# Patient Record
Sex: MALE | Race: WHITE | NOT HISPANIC OR LATINO | URBAN - METROPOLITAN AREA
[De-identification: names, ages, dates, MRNs, and addresses within clinical notes are randomized per-mention and may not be internally consistent; named-entity substitution may affect disease eponyms.]

---

## 2019-02-13 ENCOUNTER — OFFICE (AMBULATORY)
Dept: URBAN - METROPOLITAN AREA CLINIC 64 | Facility: CLINIC | Age: 59
End: 2019-02-13
Payer: MEDICARE

## 2019-02-13 VITALS
SYSTOLIC BLOOD PRESSURE: 138 MMHG | HEIGHT: 71 IN | HEART RATE: 81 BPM | WEIGHT: 214 LBS | DIASTOLIC BLOOD PRESSURE: 92 MMHG

## 2019-02-13 DIAGNOSIS — R19.5 OTHER FECAL ABNORMALITIES: ICD-10-CM

## 2019-02-13 DIAGNOSIS — R74.8 ABNORMAL LEVELS OF OTHER SERUM ENZYMES: ICD-10-CM

## 2019-02-13 DIAGNOSIS — R11.0 NAUSEA: ICD-10-CM

## 2019-02-13 DIAGNOSIS — K21.9 GASTRO-ESOPHAGEAL REFLUX DISEASE WITHOUT ESOPHAGITIS: ICD-10-CM

## 2019-02-13 PROCEDURE — 99204 OFFICE O/P NEW MOD 45 MIN: CPT | Performed by: NURSE PRACTITIONER

## 2019-02-13 PROCEDURE — 99244 OFF/OP CNSLTJ NEW/EST MOD 40: CPT | Performed by: NURSE PRACTITIONER

## 2019-02-13 RX ORDER — ONDANSETRON HYDROCHLORIDE 4 MG/1
16 TABLET, FILM COATED ORAL
Qty: 40 | Refills: 0 | Status: ACTIVE
Start: 2019-02-13

## 2019-02-13 RX ORDER — SACCHAROMYCES BOULARDII 50 MG
500 CAPSULE ORAL
Qty: 60 | Refills: 1 | Status: ACTIVE
Start: 2019-02-13

## 2019-03-15 ENCOUNTER — HOSPITAL ENCOUNTER (OUTPATIENT)
Dept: OTHER | Facility: HOSPITAL | Age: 59
Discharge: HOME | End: 2019-03-15
Attending: SURGERY | Admitting: SURGERY

## 2019-03-15 LAB
ALBUMIN SERPL-MCNC: 3.7 G/DL (ref 3.5–4.8)
ALBUMIN/GLOB SERPL: 1.1 {RATIO} (ref 1–1.7)
ALP SERPL-CCNC: 163 IU/L (ref 32–91)
ALT SERPL-CCNC: 34 IU/L (ref 17–63)
ANION GAP SERPL CALC-SCNC: 14.2 MMOL/L (ref 10–20)
AST SERPL-CCNC: 22 IU/L (ref 15–41)
BASOPHILS # BLD AUTO: 0 10*3/UL (ref 0–0.2)
BASOPHILS NFR BLD AUTO: 1 % (ref 0–2)
BILIRUB SERPL-MCNC: 0.3 MG/DL (ref 0.3–1.2)
BUN SERPL-MCNC: 16 MG/DL (ref 8–20)
BUN/CREAT SERPL: 17.8 (ref 6.2–20.3)
CALCIUM SERPL-MCNC: 9.2 MG/DL (ref 8.9–10.3)
CHLORIDE SERPL-SCNC: 108 MMOL/L (ref 101–111)
CONV CO2: 21 MMOL/L (ref 22–32)
CONV TOTAL PROTEIN: 7.2 G/DL (ref 6.1–7.9)
CREAT UR-MCNC: 0.9 MG/DL (ref 0.7–1.2)
CRP SERPL-MCNC: 0.37 MG/DL (ref 0–0.7)
DIFFERENTIAL METHOD BLD: (no result)
EOSINOPHIL # BLD AUTO: 0.1 10*3/UL (ref 0–0.3)
EOSINOPHIL # BLD AUTO: 2 % (ref 0–3)
ERYTHROCYTE [DISTWIDTH] IN BLOOD BY AUTOMATED COUNT: 17.6 % (ref 11.5–14.5)
ERYTHROCYTE [SEDIMENTATION RATE] IN BLOOD BY WESTERGREN METHOD: 31 MM/HR (ref 0–20)
GLOBULIN UR ELPH-MCNC: 3.5 G/DL (ref 2.5–3.8)
GLUCOSE SERPL-MCNC: 105 MG/DL (ref 65–99)
HCT VFR BLD AUTO: 47.2 % (ref 40–54)
HGB BLD-MCNC: 15 G/DL (ref 14–18)
LYMPHOCYTES # BLD AUTO: 1.8 10*3/UL (ref 0.8–4.8)
LYMPHOCYTES NFR BLD AUTO: 26 % (ref 18–42)
MCH RBC QN AUTO: 27.9 PG (ref 26–32)
MCHC RBC AUTO-ENTMCNC: 31.7 G/DL (ref 32–36)
MCV RBC AUTO: 88 FL (ref 80–94)
MONOCYTES # BLD AUTO: 0.6 10*3/UL (ref 0.1–1.3)
MONOCYTES NFR BLD AUTO: 8 % (ref 2–11)
NEUTROPHILS # BLD AUTO: 4.6 10*3/UL (ref 2.3–8.6)
NEUTROPHILS NFR BLD AUTO: 63 % (ref 50–75)
NRBC BLD AUTO-RTO: 0 /100{WBCS}
NRBC/RBC NFR BLD MANUAL: 0 10*3/UL
PLATELET # BLD AUTO: 307 10*3/UL (ref 150–450)
PMV BLD AUTO: 8.3 FL (ref 7.4–10.4)
POTASSIUM SERPL-SCNC: 4.2 MMOL/L (ref 3.6–5.1)
PREALB SERPL-MCNC: NORMAL MG/DL (ref 16–38)
RBC # BLD AUTO: 5.37 10*6/UL (ref 4.6–6)
SODIUM SERPL-SCNC: 139 MMOL/L (ref 136–144)
WBC # BLD AUTO: 7.2 10*3/UL (ref 4.5–11.5)

## 2019-03-22 ENCOUNTER — HOSPITAL ENCOUNTER (OUTPATIENT)
Dept: WOUND CARE | Facility: HOSPITAL | Age: 59
Discharge: HOME | End: 2019-03-22
Attending: SURGERY | Admitting: SURGERY

## 2019-04-05 ENCOUNTER — HOSPITAL ENCOUNTER (OUTPATIENT)
Dept: WOUND CARE | Facility: HOSPITAL | Age: 59
Discharge: HOME | End: 2019-04-05
Attending: SURGERY | Admitting: SURGERY

## 2019-04-10 ENCOUNTER — OFFICE (AMBULATORY)
Dept: URBAN - METROPOLITAN AREA CLINIC 64 | Facility: CLINIC | Age: 59
End: 2019-04-10

## 2019-04-10 VITALS
DIASTOLIC BLOOD PRESSURE: 49 MMHG | HEIGHT: 71 IN | SYSTOLIC BLOOD PRESSURE: 137 MMHG | WEIGHT: 217 LBS | HEART RATE: 62 BPM

## 2019-04-10 DIAGNOSIS — K21.9 GASTRO-ESOPHAGEAL REFLUX DISEASE WITHOUT ESOPHAGITIS: ICD-10-CM

## 2019-04-10 DIAGNOSIS — Z12.11 ENCOUNTER FOR SCREENING FOR MALIGNANT NEOPLASM OF COLON: ICD-10-CM

## 2019-04-10 DIAGNOSIS — R74.8 ABNORMAL LEVELS OF OTHER SERUM ENZYMES: ICD-10-CM

## 2019-04-10 PROCEDURE — 99213 OFFICE O/P EST LOW 20 MIN: CPT | Performed by: INTERNAL MEDICINE

## 2019-11-04 RX ORDER — LAMOTRIGINE 150 MG/1
TABLET ORAL
Qty: 60 TABLET | Refills: 3 | Status: SHIPPED | OUTPATIENT
Start: 2019-11-04 | End: 2019-11-08 | Stop reason: SDUPTHER

## 2019-11-08 RX ORDER — LAMOTRIGINE 150 MG/1
TABLET ORAL
Qty: 60 TABLET | Refills: 11 | Status: SHIPPED | OUTPATIENT
Start: 2019-11-08 | End: 2019-12-19

## 2019-12-19 PROBLEM — G40.909 SEIZURE DISORDER (HCC): Status: ACTIVE | Noted: 2019-12-19

## 2019-12-19 PROBLEM — G47.30 SLEEP APNEA: Status: ACTIVE | Noted: 2019-12-19

## 2019-12-19 PROBLEM — G60.9 HEREDITARY AND IDIOPATHIC NEUROPATHY: Status: ACTIVE | Noted: 2017-12-08

## 2019-12-19 PROBLEM — K64.4 EXTERNAL HEMORRHOID: Status: ACTIVE | Noted: 2018-03-22

## 2019-12-19 RX ORDER — CITALOPRAM 20 MG/1
TABLET ORAL EVERY 24 HOURS
COMMUNITY
Start: 2018-12-20

## 2019-12-19 RX ORDER — ZOLPIDEM TARTRATE 10 MG/1
TABLET ORAL
COMMUNITY
Start: 2014-09-24

## 2019-12-19 RX ORDER — ATORVASTATIN CALCIUM 80 MG/1
TABLET, FILM COATED ORAL EVERY 24 HOURS
COMMUNITY
Start: 2018-12-20 | End: 2020-07-15 | Stop reason: SDUPTHER

## 2019-12-19 RX ORDER — LAMOTRIGINE 150 MG/1
TABLET ORAL EVERY 12 HOURS
COMMUNITY
Start: 2018-08-20 | End: 2019-12-20 | Stop reason: SDUPTHER

## 2019-12-19 RX ORDER — FLUVOXAMINE MALEATE 100 MG
TABLET ORAL
COMMUNITY
Start: 2014-02-24

## 2019-12-19 RX ORDER — PREGABALIN 50 MG/1
50 CAPSULE ORAL 2 TIMES DAILY
COMMUNITY
Start: 2014-09-24 | End: 2021-01-04 | Stop reason: SDUPTHER

## 2019-12-19 RX ORDER — OMEPRAZOLE 20 MG/1
CAPSULE, DELAYED RELEASE ORAL
COMMUNITY
Start: 2014-09-24

## 2019-12-19 RX ORDER — METFORMIN HYDROCHLORIDE EXTENDED-RELEASE TABLETS 500 MG/1
TABLET, FILM COATED, EXTENDED RELEASE ORAL
COMMUNITY
Start: 2014-02-24 | End: 2020-07-15

## 2019-12-19 RX ORDER — ACETAMINOPHEN 325 MG/1
TABLET ORAL EVERY 12 HOURS
COMMUNITY
Start: 2018-12-20 | End: 2020-07-15

## 2019-12-19 RX ORDER — QUETIAPINE 300 MG/1
300 TABLET, FILM COATED, EXTENDED RELEASE ORAL
COMMUNITY
Start: 2016-09-26

## 2019-12-19 RX ORDER — TAMSULOSIN HYDROCHLORIDE 0.4 MG/1
CAPSULE ORAL
COMMUNITY
Start: 2014-09-24

## 2019-12-20 ENCOUNTER — OFFICE VISIT (OUTPATIENT)
Dept: NEUROLOGY | Facility: CLINIC | Age: 59
End: 2019-12-20

## 2019-12-20 VITALS
HEIGHT: 69 IN | HEART RATE: 110 BPM | WEIGHT: 244 LBS | BODY MASS INDEX: 36.14 KG/M2 | SYSTOLIC BLOOD PRESSURE: 126 MMHG | DIASTOLIC BLOOD PRESSURE: 83 MMHG

## 2019-12-20 DIAGNOSIS — G40.909 SEIZURE DISORDER (HCC): ICD-10-CM

## 2019-12-20 DIAGNOSIS — G47.33 OSA (OBSTRUCTIVE SLEEP APNEA): Primary | ICD-10-CM

## 2019-12-20 DIAGNOSIS — E11.42 DIABETIC POLYNEUROPATHY ASSOCIATED WITH TYPE 2 DIABETES MELLITUS (HCC): ICD-10-CM

## 2019-12-20 PROCEDURE — 99213 OFFICE O/P EST LOW 20 MIN: CPT | Performed by: PSYCHIATRY & NEUROLOGY

## 2019-12-20 RX ORDER — GLIPIZIDE 5 MG/1
TABLET ORAL
COMMUNITY
Start: 2019-11-23 | End: 2020-07-15

## 2019-12-20 RX ORDER — AMOXICILLIN 500 MG/1
CAPSULE ORAL
COMMUNITY
Start: 2019-12-19 | End: 2020-07-15

## 2019-12-20 RX ORDER — NITROFURANTOIN 25; 75 MG/1; MG/1
CAPSULE ORAL
COMMUNITY
Start: 2019-11-05 | End: 2020-07-15

## 2019-12-20 RX ORDER — LAMOTRIGINE 150 MG/1
150 TABLET ORAL 2 TIMES DAILY
Qty: 60 TABLET | Refills: 11 | Status: SHIPPED | OUTPATIENT
Start: 2019-12-20 | End: 2021-01-04 | Stop reason: SDUPTHER

## 2019-12-20 RX ORDER — CEPHALEXIN 250 MG/1
CAPSULE ORAL
COMMUNITY
Start: 2019-12-04 | End: 2021-01-04

## 2019-12-20 NOTE — PROGRESS NOTES
Subjective: seizures    Patient ID: Chirag Urbano is a 59 y.o. male.    Chief complaint: seizure disorder.    Patient here for follow up on seizures.  Patient managing with lamotrigine patient states no seizures for 7 years. Last seizure generalizedTonic Clonic seizure during a period of stress. No seizures on lamictal 300 mg per day    Pt has diabetic painful neuropathy with partial pain relief with Lyrica 50mg bid    DANICA, pt intolerant of CPAP so not treated        No new problems or issues.       The following portions of the patient's history were reviewed and updated as appropriate: allergies, current medications, past family history, past medical history, past social history, past surgical history and problem list.    History reviewed. No pertinent family history.    Past Medical History:   Diagnosis Date   • DANICA (obstructive sleep apnea)    • Seizure (CMS/HCC)    • Tremors of nervous system        Social History     Socioeconomic History   • Marital status: Single     Spouse name: Not on file   • Number of children: Not on file   • Years of education: Not on file   • Highest education level: Not on file   Tobacco Use   • Smoking status: Never Smoker   • Smokeless tobacco: Never Used   Substance and Sexual Activity   • Alcohol use: Not Currently   • Drug use: Never         Current Outpatient Medications:   •  acetaminophen (TYLENOL) 325 MG tablet, Every 12 (Twelve) Hours., Disp: , Rfl:   •  amoxicillin (AMOXIL) 500 MG capsule, , Disp: , Rfl:   •  aspirin 81 MG tablet, ASPIRIN 81 MG ORAL TABLET, Disp: , Rfl:   •  atorvastatin (LIPITOR) 80 MG tablet, Daily., Disp: , Rfl:   •  cephalexin (KEFLEX) 250 MG capsule, , Disp: , Rfl:   •  citalopram (CELEXA) 20 MG tablet, Daily., Disp: , Rfl:   •  fluvoxaMINE (LUVOX) 100 MG tablet, FLUVOXAMINE MALEATE 100 MG TABS, Disp: , Rfl:   •  glipizide (GLUCOTROL) 5 MG tablet, , Disp: , Rfl:   •  lamoTRIgine (LAMICTAL) 150 MG tablet, Every 12 (Twelve) Hours., Disp: , Rfl:   •   metFORMIN (FORTAMET) 500 MG (OSM) 24 hr tablet, METFORMIN HCL ER (OSM) 500 MG XR24H-TAB, Disp: , Rfl:   •  metFORMIN (GLUCOPHAGE) 1000 MG tablet, , Disp: , Rfl:   •  nitrofurantoin, macrocrystal-monohydrate, (MACROBID) 100 MG capsule, , Disp: , Rfl:   •  omeprazole (priLOSEC) 20 MG capsule, OMEPRAZOLE 20 MG CPDR, Disp: , Rfl:   •  pregabalin (LYRICA) 50 MG capsule, LYRICA 50 MG CAPS, Disp: , Rfl:   •  QUEtiapine XR (SEROQUEL XR) 300 MG 24 hr tablet, SEROQUEL  MG XR24H-TAB, Disp: , Rfl:   •  tamsulosin (FLOMAX) 0.4 MG capsule 24 hr capsule, TAMSULOSIN HCL 0.4 MG CAPS, Disp: , Rfl:   •  zolpidem (AMBIEN) 10 MG tablet, ZOLPIDEM TARTRATE 10 MG TABS, Disp: , Rfl:     Review of Systems   Constitutional: Negative for fatigue and fever.   HENT: Negative for postnasal drip, sinus pressure and sinus pain.    Eyes: Negative for pain and itching.   Respiratory: Negative for shortness of breath.    Cardiovascular: Negative for chest pain and leg swelling.   Gastrointestinal: Negative for nausea.   Endocrine: Negative for cold intolerance and heat intolerance.   Genitourinary: Negative for urgency.   Musculoskeletal: Negative for neck pain and neck stiffness.   Neurological: Positive for seizures. Negative for syncope.   Psychiatric/Behavioral: Negative for behavioral problems and decreased concentration.          I have reviewed ROS completed by medical assistant.     Objective:     Physical Exam   Constitutional: He is oriented to person, place, and time. He appears well-developed and well-nourished.   Neurological: He is alert and oriented to person, place, and time.   gait normal   Psychiatric: He has a normal mood and affect. His behavior is normal.   Vitals reviewed.      Assessment/Plan:    Chirag was seen today for seizures.    Diagnoses and all orders for this visit:    DANICA (obstructive sleep apnea)  ---pt intolerant of cpap, pt encouraged to avoid weight gain, sleep on side    Seizure disorder (CMS/HCC), well  controlled  ----continue lamictal 300mg per day    Diabetic polyneuropathy associated with type 2 diabetes mellitus (CMS/HCC)  ----continue lyrica 50mg bid, consider increased dose if pain worsens            This document has been electronically signed by Joseph Seipel, MD on December 20, 2019 9:14 AM

## 2020-02-19 ENCOUNTER — ON CAMPUS - OUTPATIENT (AMBULATORY)
Dept: RURAL HOSPITAL 3 | Facility: HOSPITAL | Age: 60
End: 2020-02-19

## 2020-02-19 DIAGNOSIS — K21.9 GASTRO-ESOPHAGEAL REFLUX DISEASE WITHOUT ESOPHAGITIS: ICD-10-CM

## 2020-02-19 DIAGNOSIS — K20.8 OTHER ESOPHAGITIS: ICD-10-CM

## 2020-02-19 DIAGNOSIS — R10.13 EPIGASTRIC PAIN: ICD-10-CM

## 2020-02-19 PROCEDURE — 43235 EGD DIAGNOSTIC BRUSH WASH: CPT | Performed by: INTERNAL MEDICINE

## 2020-07-15 ENCOUNTER — OFFICE VISIT (OUTPATIENT)
Dept: CARDIOLOGY | Facility: CLINIC | Age: 60
End: 2020-07-15

## 2020-07-15 VITALS
BODY MASS INDEX: 35.99 KG/M2 | HEART RATE: 92 BPM | OXYGEN SATURATION: 93 % | RESPIRATION RATE: 18 BRPM | DIASTOLIC BLOOD PRESSURE: 87 MMHG | SYSTOLIC BLOOD PRESSURE: 126 MMHG | WEIGHT: 243 LBS | HEIGHT: 69 IN

## 2020-07-15 DIAGNOSIS — I10 ESSENTIAL HYPERTENSION: ICD-10-CM

## 2020-07-15 DIAGNOSIS — I11.9 HYPERTENSIVE HEART DISEASE WITHOUT HEART FAILURE: ICD-10-CM

## 2020-07-15 DIAGNOSIS — E78.2 MIXED HYPERLIPIDEMIA: Primary | ICD-10-CM

## 2020-07-15 DIAGNOSIS — I25.10 ARTERIOSCLEROSIS OF CORONARY ARTERY: ICD-10-CM

## 2020-07-15 PROCEDURE — 99214 OFFICE O/P EST MOD 30 MIN: CPT | Performed by: INTERNAL MEDICINE

## 2020-07-15 PROCEDURE — 93000 ELECTROCARDIOGRAM COMPLETE: CPT | Performed by: INTERNAL MEDICINE

## 2020-07-15 RX ORDER — GLIPIZIDE 10 MG/1
10 TABLET ORAL DAILY
COMMUNITY

## 2020-07-15 RX ORDER — ATORVASTATIN CALCIUM 80 MG/1
80 TABLET, FILM COATED ORAL DAILY
Qty: 90 TABLET | Refills: 3 | Status: SHIPPED | OUTPATIENT
Start: 2020-07-15

## 2020-07-15 RX ORDER — MULTIPLE VITAMINS W/ MINERALS TAB 9MG-400MCG
1 TAB ORAL DAILY
COMMUNITY

## 2020-07-15 NOTE — PROGRESS NOTES
"Cardiology Office Visit      Encounter Date:  07/15/2020    Patient ID:   Chirag Urbano is a 60 y.o. male.    Reason For Followup:  Coronary artery disease  Hypertension  Hyperlipidemia    Brief Clinical History:  Dear Dr. Crockett, Christofer Baer MD (Inactive)    I had the pleasure of seeing Chirag Urbano today. As you are well aware, this is a 60 y.o. male with an established history of ischemic heart disease.  He has additional history that includes hypertension, hypertensive cardiovascular disease, hyperlipidemia, diabetes mellitus, depression/anxiety, chronic pain, acid reflux, and antiplatelet therapy.  He presents today for follow-up on the above conditions.    Interval History:  He denies any chest pain pressure heaviness or tightness.  He denies any shortness of breath out of character.  He denies any PND orthopnea.  He denies any syncope or near syncope.  He reports feeling in his usual state of health.      Assessment & Plan    Impressions:  Coronary artery disease     Cardiac catheterization 2015         50% proximal LAD, 30 to 40% mid RCA  Hypertension  Hypertensive cardiovascular disease  Hyperlipidemia  Diabetes mellitus  Depression/anxiety  Chronic pain  Acid reflux  Antiplatelet therapy    Recommendations:  Continuation of his current cardiovascular regimen at the present time.  Routine surveillance labs  Follow-up in 1 years time with primary cardiologist sooner should there be difficulties.    Objective:    Vitals:  Vitals:    07/15/20 1445   BP: 126/87   BP Location: Left arm   Pulse: 92   Resp: 18   SpO2: 93%   Weight: 110 kg (243 lb)   Height: 175.3 cm (69\")       Physical Exam:    General: Alert, cooperative, no distress, appears stated age  Head:  Normocephalic, atraumatic, mucous membranes moist  Eyes:  Conjunctiva/corneas clear, EOM's intact     Neck:  Supple,  no bruit  Lungs: Coarse and diminished bilaterally  Chest wall: No tenderness  Heart::  Regular rate and rhythm, S1 and S2 " normal, 1/6 holosystolic murmur.  No rub or gallop  Abdomen: Soft, non-tender, nondistended bowel sounds active mild obesity  Extremities: No cyanosis, clubbing, or edema  Pulses: Diminished pedal pulses  Skin:  No rashes or lesions  Neuro/psych: A&O x3. CN II through XII are grossly intact with appropriate affect      Allergies:  No Known Allergies    Medication Review:     Current Outpatient Medications:   •  aspirin 81 MG tablet, ASPIRIN 81 MG ORAL TABLET, Disp: , Rfl:   •  atorvastatin (LIPITOR) 80 MG tablet, Daily., Disp: , Rfl:   •  cephalexin (KEFLEX) 250 MG capsule, , Disp: , Rfl:   •  citalopram (CELEXA) 20 MG tablet, Daily., Disp: , Rfl:   •  fluvoxaMINE (LUVOX) 100 MG tablet, 1.5 tablet daily, Disp: , Rfl:   •  glipizide (GLUCOTROL) 10 MG tablet, Take 10 mg by mouth Daily., Disp: , Rfl:   •  lamoTRIgine (LAMICTAL) 150 MG tablet, Take 1 tablet by mouth 2 (Two) Times a Day., Disp: 60 tablet, Rfl: 11  •  metFORMIN (GLUCOPHAGE) 1000 MG tablet, Take 1,000 mg by mouth 2 (Two) Times a Day With Meals., Disp: , Rfl:   •  Multiple Vitamins-Minerals (MULTIVITAMIN WITH MINERALS) tablet tablet, Take 1 tablet by mouth Daily., Disp: , Rfl:   •  omeprazole (priLOSEC) 20 MG capsule, OMEPRAZOLE 20 MG CPDR, Disp: , Rfl:   •  pregabalin (LYRICA) 50 MG capsule, Take 50 mg by mouth 2 (Two) Times a Day., Disp: , Rfl:   •  QUEtiapine XR (SEROQUEL XR) 300 MG 24 hr tablet, Take 300 mg by mouth. 2 capsules at bedtime, Disp: , Rfl:   •  Semaglutide, 1 MG/DOSE, (OZEMPIC) 2 MG/1.5ML solution pen-injector, Inject  under the skin into the appropriate area as directed 1 (One) Time Per Week., Disp: , Rfl:   •  tamsulosin (FLOMAX) 0.4 MG capsule 24 hr capsule, TAMSULOSIN HCL 0.4 MG CAPS, Disp: , Rfl:   •  zolpidem (AMBIEN) 10 MG tablet, ZOLPIDEM TARTRATE 10 MG TABS, Disp: , Rfl:     Family History:  History reviewed. No pertinent family history.    Past Medical History:  Past Medical History:   Diagnosis Date   • CAD (coronary artery  disease)    • Hyperlipidemia    • Hypertension    • DANICA (obstructive sleep apnea)    • Seizure (CMS/HCC)    • Tremors of nervous system        Past surgical History:  Past Surgical History:   Procedure Laterality Date   • APPENDECTOMY     • HERNIA REPAIR         Social History:  Social History     Socioeconomic History   • Marital status: Single     Spouse name: Not on file   • Number of children: Not on file   • Years of education: Not on file   • Highest education level: Not on file   Tobacco Use   • Smoking status: Never Smoker   • Smokeless tobacco: Never Used   Substance and Sexual Activity   • Alcohol use: Not Currently   • Drug use: Never       Review of Systems:  The following systems were reviewed as they relate to the cardiovascular system: Constitutional, Eyes, ENT, Cardiovascular, Respiratory, Gastrointestinal, Integumentary, Neurological, Psychiatric, Hematologic, Endocrine, Musculoskeletal, and Genitourinary. The pertinent cardiovascular findings are reported above with all other cardiovascular points within those systems being negative.    Diagnostic Study Review:     Current Electrocardiogram:    ECG 12 Lead  Date/Time: 7/15/2020 8:38 AM  Performed by: Kt Polo DO  Authorized by: Kt Polo DO   Comparison: not compared with previous ECG   Previous ECG: no previous ECG available  Comments: Normal sinus rhythm with a ventricular rate of 92 bpm.  Low voltage in the precordial leads.  Normal QT and QTc intervals.              NOTE: The following portions of the patient's history were reviewed and updated this visit as appropriate: allergies, current medications, past family history, past medical history, past social history, past surgical history and problem list.

## 2020-07-22 PROBLEM — I11.9 HYPERTENSIVE HEART DISEASE WITHOUT HEART FAILURE: Status: ACTIVE | Noted: 2020-07-22

## 2020-11-23 ENCOUNTER — TRANSCRIBE ORDERS (OUTPATIENT)
Dept: ADMINISTRATIVE | Facility: HOSPITAL | Age: 60
End: 2020-11-23

## 2020-11-23 ENCOUNTER — HOSPITAL ENCOUNTER (OUTPATIENT)
Dept: CARDIOLOGY | Facility: HOSPITAL | Age: 60
Discharge: HOME | End: 2020-11-23

## 2020-11-23 ENCOUNTER — LAB (OUTPATIENT)
Dept: LAB | Facility: HOSPITAL | Age: 60
End: 2020-11-23

## 2020-11-23 DIAGNOSIS — N13.8 NODULAR PROSTATE WITH URINARY OBSTRUCTION: Primary | ICD-10-CM

## 2020-11-23 DIAGNOSIS — N13.8 NODULAR PROSTATE WITH URINARY OBSTRUCTION: ICD-10-CM

## 2020-11-23 DIAGNOSIS — N40.3 NODULAR PROSTATE WITH URINARY OBSTRUCTION: ICD-10-CM

## 2020-11-23 DIAGNOSIS — N40.3 NODULAR PROSTATE WITH URINARY OBSTRUCTION: Primary | ICD-10-CM

## 2020-11-23 LAB
ANION GAP SERPL CALCULATED.3IONS-SCNC: 11.1 MMOL/L (ref 5–15)
BASOPHILS # BLD AUTO: 0.03 10*3/MM3 (ref 0–0.2)
BASOPHILS NFR BLD AUTO: 0.5 % (ref 0–1.5)
BUN SERPL-MCNC: 13 MG/DL (ref 6–20)
BUN/CREAT SERPL: 16.5 (ref 7–25)
CALCIUM SPEC-SCNC: 10 MG/DL (ref 8.6–10.5)
CHLORIDE SERPL-SCNC: 101 MMOL/L (ref 98–107)
CO2 SERPL-SCNC: 26.9 MMOL/L (ref 22–29)
CREAT SERPL-MCNC: 0.79 MG/DL (ref 0.76–1.27)
DEPRECATED RDW RBC AUTO: 49.5 FL (ref 37–54)
EOSINOPHIL # BLD AUTO: 0.07 10*3/MM3 (ref 0–0.4)
EOSINOPHIL NFR BLD AUTO: 1.1 % (ref 0.3–6.2)
ERYTHROCYTE [DISTWIDTH] IN BLOOD BY AUTOMATED COUNT: 18.3 % (ref 12.3–15.4)
GFR SERPL CREATININE-BSD FRML MDRD: 100 ML/MIN/1.73
GLUCOSE SERPL-MCNC: 139 MG/DL (ref 65–99)
HCT VFR BLD AUTO: 46.6 % (ref 37.5–51)
HGB BLD-MCNC: 14.6 G/DL (ref 13–17.7)
IMM GRANULOCYTES # BLD AUTO: 0.03 10*3/MM3 (ref 0–0.05)
IMM GRANULOCYTES NFR BLD AUTO: 0.5 % (ref 0–0.5)
LYMPHOCYTES # BLD AUTO: 1.24 10*3/MM3 (ref 0.7–3.1)
LYMPHOCYTES NFR BLD AUTO: 18.7 % (ref 19.6–45.3)
MCH RBC QN AUTO: 25 PG (ref 26.6–33)
MCHC RBC AUTO-ENTMCNC: 31.3 G/DL (ref 31.5–35.7)
MCV RBC AUTO: 79.9 FL (ref 79–97)
MONOCYTES # BLD AUTO: 0.45 10*3/MM3 (ref 0.1–0.9)
MONOCYTES NFR BLD AUTO: 6.8 % (ref 5–12)
NEUTROPHILS NFR BLD AUTO: 4.81 10*3/MM3 (ref 1.7–7)
NEUTROPHILS NFR BLD AUTO: 72.4 % (ref 42.7–76)
NRBC BLD AUTO-RTO: 0 /100 WBC (ref 0–0.2)
PLATELET # BLD AUTO: 339 10*3/MM3 (ref 140–450)
PMV BLD AUTO: 10 FL (ref 6–12)
POTASSIUM SERPL-SCNC: 4.7 MMOL/L (ref 3.5–5.2)
RBC # BLD AUTO: 5.83 10*6/MM3 (ref 4.14–5.8)
SODIUM SERPL-SCNC: 139 MMOL/L (ref 136–145)
WBC # BLD AUTO: 6.63 10*3/MM3 (ref 3.4–10.8)

## 2020-11-23 PROCEDURE — 85025 COMPLETE CBC W/AUTO DIFF WBC: CPT

## 2020-11-23 PROCEDURE — 80048 BASIC METABOLIC PNL TOTAL CA: CPT

## 2020-11-23 PROCEDURE — 36415 COLL VENOUS BLD VENIPUNCTURE: CPT

## 2020-11-23 PROCEDURE — 93010 ELECTROCARDIOGRAM REPORT: CPT | Performed by: INTERNAL MEDICINE

## 2020-11-23 PROCEDURE — 93005 ELECTROCARDIOGRAM TRACING: CPT | Performed by: UROLOGY

## 2020-12-05 LAB — QT INTERVAL: 392 MS

## 2021-01-04 ENCOUNTER — OFFICE VISIT (OUTPATIENT)
Dept: NEUROLOGY | Facility: CLINIC | Age: 61
End: 2021-01-04

## 2021-01-04 VITALS
WEIGHT: 241.8 LBS | HEIGHT: 71 IN | DIASTOLIC BLOOD PRESSURE: 81 MMHG | BODY MASS INDEX: 33.85 KG/M2 | TEMPERATURE: 96.8 F | SYSTOLIC BLOOD PRESSURE: 123 MMHG | HEART RATE: 103 BPM

## 2021-01-04 DIAGNOSIS — G40.909 SEIZURE DISORDER (HCC): Primary | ICD-10-CM

## 2021-01-04 DIAGNOSIS — E11.42 DIABETIC POLYNEUROPATHY ASSOCIATED WITH TYPE 2 DIABETES MELLITUS (HCC): ICD-10-CM

## 2021-01-04 DIAGNOSIS — G47.33 OSA (OBSTRUCTIVE SLEEP APNEA): ICD-10-CM

## 2021-01-04 PROBLEM — K21.9 GASTROESOPHAGEAL REFLUX DISEASE: Status: ACTIVE | Noted: 2021-01-04

## 2021-01-04 PROBLEM — R73.03 PREDIABETES: Status: ACTIVE | Noted: 2021-01-04

## 2021-01-04 PROBLEM — F41.8 MIXED ANXIETY DEPRESSIVE DISORDER: Status: ACTIVE | Noted: 2021-01-04

## 2021-01-04 PROCEDURE — 99213 OFFICE O/P EST LOW 20 MIN: CPT | Performed by: PSYCHIATRY & NEUROLOGY

## 2021-01-04 RX ORDER — FINASTERIDE 5 MG/1
TABLET, FILM COATED ORAL
COMMUNITY
Start: 2020-10-12

## 2021-01-04 RX ORDER — HYDROCODONE BITARTRATE AND ACETAMINOPHEN 10; 325 MG/1; MG/1
TABLET ORAL
COMMUNITY
Start: 2020-11-30 | End: 2021-01-04

## 2021-01-04 RX ORDER — PREGABALIN 50 MG/1
50 CAPSULE ORAL 2 TIMES DAILY
Qty: 60 CAPSULE | Refills: 5 | Status: SHIPPED | OUTPATIENT
Start: 2021-01-04 | End: 2021-07-13

## 2021-01-04 RX ORDER — LAMOTRIGINE 150 MG/1
150 TABLET ORAL 2 TIMES DAILY
Qty: 60 TABLET | Refills: 11 | Status: SHIPPED | OUTPATIENT
Start: 2021-01-04 | End: 2022-01-04 | Stop reason: SDUPTHER

## 2021-01-04 NOTE — PROGRESS NOTES
Subjective: Seizure disorder, diabetic polyneuropathy associated with type 2 diabetes mellitus, DANICA    Patient ID: Chirag Urbano is a 60 y.o. male.    History of Present Illness, yearly f/u    Seizure disorder, No seizures on lamictal 300 mg per day  No seizures for 10 years on lamictal 150mg bid    Diabetic polyneuropathy associated with type 2 diabetes mellitus, treated with Lyrica 50 mg bid. Pain controlled with this dose of 100mg per day    DANICA, pt intolerant of CPAP so not treated    The following portions of the patient's history were reviewed and updated as appropriate: allergies, current medications, past family history, past medical history, past social history, past surgical history and problem list.    History reviewed. No pertinent family history.    Past Medical History:   Diagnosis Date   • CAD (coronary artery disease)    • Hyperlipidemia    • Hypertension    • DANICA (obstructive sleep apnea)    • Seizure (CMS/HCC)    • Tremors of nervous system        Social History     Socioeconomic History   • Marital status: Single     Spouse name: Not on file   • Number of children: Not on file   • Years of education: Not on file   • Highest education level: Not on file   Tobacco Use   • Smoking status: Never Smoker   • Smokeless tobacco: Never Used   Substance and Sexual Activity   • Alcohol use: Not Currently   • Drug use: Never   • Sexual activity: Defer         Current Outpatient Medications:   •  aspirin 81 MG tablet, ASPIRIN 81 MG ORAL TABLET, Disp: , Rfl:   •  atorvastatin (LIPITOR) 80 MG tablet, Take 1 tablet by mouth Daily., Disp: 90 tablet, Rfl: 3  •  citalopram (CELEXA) 20 MG tablet, Daily., Disp: , Rfl:   •  finasteride (PROSCAR) 5 MG tablet, , Disp: , Rfl:   •  fluvoxaMINE (LUVOX) 100 MG tablet, 1.5 tablet daily, Disp: , Rfl:   •  glipizide (GLUCOTROL) 10 MG tablet, Take 10 mg by mouth Daily., Disp: , Rfl:   •  lamoTRIgine (LAMICTAL) 150 MG tablet, Take 1 tablet by mouth 2 (Two) Times a Day., Disp:  60 tablet, Rfl: 11  •  metFORMIN (GLUCOPHAGE) 1000 MG tablet, Take 1,000 mg by mouth 2 (Two) Times a Day With Meals., Disp: , Rfl:   •  Multiple Vitamins-Minerals (MULTIVITAMIN WITH MINERALS) tablet tablet, Take 1 tablet by mouth Daily., Disp: , Rfl:   •  omeprazole (priLOSEC) 20 MG capsule, OMEPRAZOLE 20 MG CPDR, Disp: , Rfl:   •  pregabalin (LYRICA) 50 MG capsule, Take 50 mg by mouth 2 (Two) Times a Day., Disp: , Rfl:   •  QUEtiapine XR (SEROQUEL XR) 300 MG 24 hr tablet, Take 300 mg by mouth. 2 capsules at bedtime, Disp: , Rfl:   •  Semaglutide, 1 MG/DOSE, (OZEMPIC) 2 MG/1.5ML solution pen-injector, Inject  under the skin into the appropriate area as directed 1 (One) Time Per Week., Disp: , Rfl:   •  tamsulosin (FLOMAX) 0.4 MG capsule 24 hr capsule, TAMSULOSIN HCL 0.4 MG CAPS, Disp: , Rfl:   •  zolpidem (AMBIEN) 10 MG tablet, ZOLPIDEM TARTRATE 10 MG TABS, Disp: , Rfl:     Review of Systems   Constitutional: Negative for fatigue and fever.   HENT: Negative for postnasal drip, sinus pressure and sinus pain.    Eyes: Negative for pain and itching.   Respiratory: Positive for apnea. Negative for shortness of breath.    Cardiovascular: Negative for chest pain and leg swelling.   Gastrointestinal: Negative for nausea.   Endocrine: Negative for cold intolerance and heat intolerance.   Genitourinary: Positive for frequency. Negative for urgency.   Musculoskeletal: Negative for neck pain and neck stiffness.   Allergic/Immunologic: Negative for environmental allergies.   Neurological: Positive for numbness. Negative for dizziness, tremors, seizures, syncope, facial asymmetry, speech difficulty, weakness, light-headedness and headaches.   Psychiatric/Behavioral: Negative for behavioral problems and decreased concentration.          I have reviewed ROS completed by medical assistant.     Objective:    Neurologic Exam     Mental Status   Oriented to person, place, and time.       Physical Exam  Vitals signs reviewed.    Constitutional:       Appearance: Normal appearance. He is normal weight.   HENT:      Head: Normocephalic.   Neurological:      General: No focal deficit present.      Mental Status: He is alert and oriented to person, place, and time.   Psychiatric:         Mood and Affect: Mood normal.         Behavior: Behavior normal.         Assessment/Plan:    Diagnoses and all orders for this visit:    1. Seizure disorder (CMS/[facility]) (Primary)    2. Diabetic polyneuropathy associated with type 2 diabetes mellitus (CMS/HCC)    3. DANICA (obstructive sleep apnea)        Continue Lamictal and lyrica  Recommend weight loss   Pt unable to do cpapp    This document has been electronically signed by Joseph Seipel, MD on January 4, 2021 16:09 EST

## 2021-07-13 DIAGNOSIS — G40.909 SEIZURE DISORDER (HCC): ICD-10-CM

## 2021-07-13 DIAGNOSIS — E11.42 DIABETIC POLYNEUROPATHY ASSOCIATED WITH TYPE 2 DIABETES MELLITUS (HCC): ICD-10-CM

## 2021-07-13 RX ORDER — PREGABALIN 50 MG/1
CAPSULE ORAL
Qty: 60 CAPSULE | Refills: 5 | Status: SHIPPED | OUTPATIENT
Start: 2021-07-13 | End: 2022-01-31

## 2021-07-21 ENCOUNTER — OFFICE VISIT (OUTPATIENT)
Dept: CARDIOLOGY | Facility: CLINIC | Age: 61
End: 2021-07-21

## 2021-07-21 VITALS
OXYGEN SATURATION: 94 % | DIASTOLIC BLOOD PRESSURE: 75 MMHG | WEIGHT: 243 LBS | SYSTOLIC BLOOD PRESSURE: 116 MMHG | BODY MASS INDEX: 33.89 KG/M2 | HEART RATE: 96 BPM

## 2021-07-21 DIAGNOSIS — I11.9 HYPERTENSIVE HEART DISEASE WITHOUT HEART FAILURE: Primary | ICD-10-CM

## 2021-07-21 DIAGNOSIS — I25.10 ARTERIOSCLEROSIS OF CORONARY ARTERY: ICD-10-CM

## 2021-07-21 DIAGNOSIS — E78.2 MIXED HYPERLIPIDEMIA: ICD-10-CM

## 2021-07-21 PROCEDURE — 99214 OFFICE O/P EST MOD 30 MIN: CPT | Performed by: INTERNAL MEDICINE

## 2021-07-21 PROCEDURE — 93000 ELECTROCARDIOGRAM COMPLETE: CPT | Performed by: INTERNAL MEDICINE

## 2021-07-21 NOTE — PROGRESS NOTES
Cardiology Office Visit      Encounter Date:  07/21/2021    Patient ID:   Chirag Urbano is a 61 y.o. male.    Reason For Followup:  Coronary artery disease  Hypertension  Hyperlipidemia     Brief Clinical History:       I had the pleasure of seeing Chirag Urbano today. As you are well aware, this is a 61 y.o. male with an established history of ischemic heart disease.  He has additional history that includes hypertension, hypertensive cardiovascular disease, hyperlipidemia, diabetes mellitus, depression/anxiety, chronic pain, acid reflux, and antiplatelet therapy.  He presents today for follow-up on the above conditions.     Interval History:  He denies any chest pain pressure heaviness or tightness.  He denies any shortness of breath out of character.  He denies any PND orthopnea.  He denies any syncope or near syncope.  He reports feeling in his usual state of health.    Impressions:  Coronary artery disease     Cardiac catheterization 2015         50% proximal LAD, 30 to 40% mid RCA  Hypertension  Hypertensive cardiovascular disease  Hyperlipidemia  Diabetes mellitus  Depression/anxiety  Chronic pain  Acid reflux  Antiplatelet therapy     Recommendations:  Continuation of his current cardiovascular regimen at the present time.  Routine surveillance labs  Recent labs reviewed and discussed with patient  Follow-up in 1 year        Objective:    Vitals:  Vitals:    07/21/21 1320   BP: 116/75   Pulse: 96   SpO2: 94%   Weight: 110 kg (243 lb)       Physical Exam:    General: Alert, cooperative, no distress, appears stated age  Head:  Normocephalic, atraumatic, mucous membranes moist  Eyes:  Conjunctiva/corneas clear, EOM's intact     Neck:  Supple,  no adenopathy;      Lungs: Clear to auscultation bilaterally, no wheezes rhonchi rales are noted  Chest wall: No tenderness  Heart::  Regular rate and rhythm, S1 and S2 normal, no murmur, rub or gallop  Abdomen: Soft, non-tender, nondistended bowel sounds  active  Extremities: No cyanosis, clubbing, or edema  Pulses: 2+ and symmetric all extremities  Skin:  No rashes or lesions  Neuro/psych: A&O x3. CN II through XII are grossly intact with appropriate affect      Allergies:  No Known Allergies    Medication Review:     Current Outpatient Medications:   •  aspirin 81 MG tablet, ASPIRIN 81 MG ORAL TABLET, Disp: , Rfl:   •  atorvastatin (LIPITOR) 80 MG tablet, Take 1 tablet by mouth Daily., Disp: 90 tablet, Rfl: 3  •  citalopram (CELEXA) 20 MG tablet, Daily., Disp: , Rfl:   •  finasteride (PROSCAR) 5 MG tablet, , Disp: , Rfl:   •  fluvoxaMINE (LUVOX) 100 MG tablet, 1.5 tablet daily, Disp: , Rfl:   •  glipizide (GLUCOTROL) 10 MG tablet, Take 10 mg by mouth Daily., Disp: , Rfl:   •  Insulin Aspart (NOVOLOG SC), Inject  under the skin into the appropriate area as directed. 50 units BID, Disp: , Rfl:   •  lamoTRIgine (LaMICtal) 150 MG tablet, Take 1 tablet by mouth 2 (Two) Times a Day., Disp: 60 tablet, Rfl: 11  •  metFORMIN (GLUCOPHAGE) 1000 MG tablet, Take 1,000 mg by mouth 2 (Two) Times a Day With Meals., Disp: , Rfl:   •  Multiple Vitamins-Minerals (MULTIVITAMIN WITH MINERALS) tablet tablet, Take 1 tablet by mouth Daily., Disp: , Rfl:   •  omeprazole (priLOSEC) 20 MG capsule, OMEPRAZOLE 20 MG CPDR, Disp: , Rfl:   •  pregabalin (LYRICA) 50 MG capsule, TAKE ONE CAPSULE BY MOUTH TWO TIMES A DAY, Disp: 60 capsule, Rfl: 5  •  QUEtiapine XR (SEROQUEL XR) 300 MG 24 hr tablet, Take 300 mg by mouth. 2 capsules at bedtime, Disp: , Rfl:   •  Semaglutide, 1 MG/DOSE, (OZEMPIC) 2 MG/1.5ML solution pen-injector, Inject  under the skin into the appropriate area as directed 1 (One) Time Per Week., Disp: , Rfl:   •  tamsulosin (FLOMAX) 0.4 MG capsule 24 hr capsule, TAMSULOSIN HCL 0.4 MG CAPS, Disp: , Rfl:   •  zolpidem (AMBIEN) 10 MG tablet, ZOLPIDEM TARTRATE 10 MG TABS, Disp: , Rfl:     Family History:  History reviewed. No pertinent family history.    Past Medical History:  Past  Medical History:   Diagnosis Date   • CAD (coronary artery disease)    • Hyperlipidemia    • Hypertension    • DANICA (obstructive sleep apnea)    • Seizure (CMS/HCC)    • Tremors of nervous system        Past surgical History:  Past Surgical History:   Procedure Laterality Date   • APPENDECTOMY     • HERNIA REPAIR         Social History:  Social History     Socioeconomic History   • Marital status: Single     Spouse name: Not on file   • Number of children: Not on file   • Years of education: Not on file   • Highest education level: Not on file   Tobacco Use   • Smoking status: Never Smoker   • Smokeless tobacco: Never Used   Vaping Use   • Vaping Use: Never used   Substance and Sexual Activity   • Alcohol use: Not Currently   • Drug use: Never   • Sexual activity: Defer       Review of Systems:  The following systems were reviewed as they relate to the cardiovascular system: Constitutional, Eyes, ENT, Cardiovascular, Respiratory, Gastrointestinal, Integumentary, Neurological, Psychiatric, Hematologic, Endocrine, Musculoskeletal, and Genitourinary. The pertinent cardiovascular findings are reported above with all other cardiovascular points within those systems being negative.    Diagnostic Study Review:     Current Electrocardiogram:    ECG 12 Lead    Date/Time: 7/21/2021 1:46 PM  Performed by: Yahir Staley MD  Authorized by: Yahir Staley MD   Comparison: compared with previous ECG   Similar to previous ECG  Rate: normal  BPM: 96  Conduction: conduction normal  QRS axis: normal  Other findings: non-specific ST-T wave changes    Clinical impression: abnormal EKG              NOTE: The following portions of the patient's history were reviewed and updated this visit as appropriate: allergies, current medications, past family history, past medical history, past social history, past surgical history and problem list.

## 2022-01-04 ENCOUNTER — OFFICE VISIT (OUTPATIENT)
Dept: NEUROLOGY | Facility: CLINIC | Age: 62
End: 2022-01-04

## 2022-01-04 VITALS
BODY MASS INDEX: 34.72 KG/M2 | WEIGHT: 248 LBS | SYSTOLIC BLOOD PRESSURE: 113 MMHG | HEART RATE: 93 BPM | TEMPERATURE: 97.1 F | RESPIRATION RATE: 16 BRPM | DIASTOLIC BLOOD PRESSURE: 77 MMHG | HEIGHT: 71 IN

## 2022-01-04 DIAGNOSIS — G40.909 SEIZURE DISORDER: Primary | ICD-10-CM

## 2022-01-04 PROCEDURE — 99213 OFFICE O/P EST LOW 20 MIN: CPT | Performed by: PSYCHIATRY & NEUROLOGY

## 2022-01-04 RX ORDER — INSULIN ASPART 100 [IU]/ML
INJECTION, SUSPENSION SUBCUTANEOUS
COMMUNITY
Start: 2021-12-10

## 2022-01-04 RX ORDER — FLURBIPROFEN SODIUM 0.3 MG/ML
SOLUTION/ DROPS OPHTHALMIC
COMMUNITY
Start: 2021-12-15

## 2022-01-04 RX ORDER — LAMOTRIGINE 150 MG/1
150 TABLET ORAL 2 TIMES DAILY
Qty: 60 TABLET | Refills: 11 | Status: SHIPPED | OUTPATIENT
Start: 2022-01-04

## 2022-01-31 DIAGNOSIS — E11.42 DIABETIC POLYNEUROPATHY ASSOCIATED WITH TYPE 2 DIABETES MELLITUS: ICD-10-CM

## 2022-01-31 DIAGNOSIS — G40.909 SEIZURE DISORDER: ICD-10-CM

## 2022-01-31 RX ORDER — PREGABALIN 50 MG/1
CAPSULE ORAL
Qty: 60 CAPSULE | Refills: 5 | Status: SHIPPED | OUTPATIENT
Start: 2022-01-31 | End: 2022-08-03

## 2022-08-03 DIAGNOSIS — E11.42 DIABETIC POLYNEUROPATHY ASSOCIATED WITH TYPE 2 DIABETES MELLITUS: ICD-10-CM

## 2022-08-03 DIAGNOSIS — G40.909 SEIZURE DISORDER: ICD-10-CM

## 2022-08-03 RX ORDER — PREGABALIN 50 MG/1
CAPSULE ORAL
Qty: 60 CAPSULE | Refills: 5 | Status: SHIPPED | OUTPATIENT
Start: 2022-08-03 | End: 2022-08-09

## 2022-08-09 DIAGNOSIS — G40.909 SEIZURE DISORDER: ICD-10-CM

## 2022-08-09 DIAGNOSIS — E11.42 DIABETIC POLYNEUROPATHY ASSOCIATED WITH TYPE 2 DIABETES MELLITUS: ICD-10-CM

## 2022-08-09 RX ORDER — PREGABALIN 50 MG/1
CAPSULE ORAL
Qty: 60 CAPSULE | Refills: 5 | Status: SHIPPED | OUTPATIENT
Start: 2022-08-09 | End: 2023-01-25

## 2022-12-28 NOTE — PROGRESS NOTES
Chief Complaint  Follow-up (SEIZURES AND DANICA)    Subjective          Chirag Urbano presents to [facility] for Seizures  History of Present Illness     Seizures-patient states last seizure was 10-12 yrs ago, he currently takes lamictal 150mg 1 bid and reports no side effects.    DANICA-  pt intolerant of CPAP so not treated    Has rare headaches, take otc med for relief    =====prev. Ov 1/4/22 dr seipel=====  Patient is here for follow up on Seizure disorder he states his last seizure was 11 yrs ago, he is currently taking lamictal 150mg 1 bid and reports no side effects.  Seizure Disorder, onset of seizurs, 20 years ago, idiopathic, eeg 2014 no epileptiform activity     DANICA-  pt intolerant of CPAP so not treated       Current Outpatient Medications:   •  aspirin 81 MG tablet, ASPIRIN 81 MG ORAL TABLET, Disp: , Rfl:   •  atorvastatin (LIPITOR) 80 MG tablet, Take 1 tablet by mouth Daily., Disp: 90 tablet, Rfl: 3  •  B-D UF III MINI PEN NEEDLES 31G X 5 MM misc, , Disp: , Rfl:   •  citalopram (CeleXA) 20 MG tablet, Daily., Disp: , Rfl:   •  finasteride (PROSCAR) 5 MG tablet, , Disp: , Rfl:   •  fluvoxaMINE (LUVOX) 100 MG tablet, 1.5 tablet daily, Disp: , Rfl:   •  glipizide (GLUCOTROL) 10 MG tablet, Take 10 mg by mouth Daily., Disp: , Rfl:   •  Insulin Aspart (NOVOLOG SC), Inject  under the skin into the appropriate area as directed. 50 units BID, Disp: , Rfl:   •  lamoTRIgine (LaMICtal) 150 MG tablet, Take 1 tablet by mouth 2 (Two) Times a Day., Disp: 60 tablet, Rfl: 11  •  metFORMIN (GLUCOPHAGE) 1000 MG tablet, Take 1,000 mg by mouth 2 (Two) Times a Day With Meals., Disp: , Rfl:   •  Multiple Vitamins-Minerals (MULTIVITAMIN WITH MINERALS) tablet tablet, Take 1 tablet by mouth Daily., Disp: , Rfl:   •  NovoLOG Mix 70/30 FlexPen (70-30) 100 UNIT/ML suspension pen-injector injection, , Disp: , Rfl:   •  omeprazole (priLOSEC) 20 MG capsule, OMEPRAZOLE 20 MG CPDR, Disp: , Rfl:   •  Ozempic, 2  MG/DOSE, 8 MG/3ML solution pen-injector, , Disp: , Rfl:   •  pregabalin (LYRICA) 50 MG capsule, TAKE ONE CAPSULE BY MOUTH TWO TIMES A DAY, Disp: 60 capsule, Rfl: 5  •  QUEtiapine XR (SEROquel XR) 300 MG 24 hr tablet, Take 300 mg by mouth. 2 capsules at bedtime, Disp: , Rfl:   •  tamsulosin (FLOMAX) 0.4 MG capsule 24 hr capsule, TAMSULOSIN HCL 0.4 MG CAPS, Disp: , Rfl:   •  zolpidem (AMBIEN) 10 MG tablet, ZOLPIDEM TARTRATE 10 MG TABS, Disp: , Rfl:     Review of Systems   Neurological: Negative for seizures and headaches.          Objective:    Vital Signs:   /81   Pulse 94   Temp 97.8 °F (36.6 °C) (Infrared)   Resp 18   Ht 180.3 cm (71\")   Wt 114 kg (251 lb)   BMI 35.01 kg/m²     Physical Exam  Vitals reviewed.   Cardiovascular:      Rate and Rhythm: Normal rate.   Pulmonary:      Effort: Pulmonary effort is normal. No respiratory distress.   Neurological:      General: No focal deficit present.      Mental Status: He is alert.   Psychiatric:         Mood and Affect: Mood normal.        Result Review :                    Assessment and Plan    Diagnoses and all orders for this visit:    1. Nonintractable epilepsy without status epilepticus, unspecified epilepsy type (HCC) (Primary)     Continue Lamictal 150 mg po bid.  Pt asked about dc of medication, this is not recommended due to frequent seizures in the past          Follow Up   Return in about 1 year (around 1/4/2024).  Patient was given instructions and counseling regarding his condition or for health maintenance advice. Please see specific information pulled into the AVS if appropriate.     This document has been electronically signed by Joseph Seipel, MD on January 4, 2023 10:45 EST

## 2023-01-04 ENCOUNTER — OFFICE VISIT (OUTPATIENT)
Dept: NEUROLOGY | Facility: CLINIC | Age: 63
End: 2023-01-04
Payer: MEDICARE

## 2023-01-04 VITALS
HEIGHT: 71 IN | TEMPERATURE: 97.8 F | DIASTOLIC BLOOD PRESSURE: 81 MMHG | RESPIRATION RATE: 18 BRPM | WEIGHT: 251 LBS | SYSTOLIC BLOOD PRESSURE: 127 MMHG | HEART RATE: 94 BPM | BODY MASS INDEX: 35.14 KG/M2

## 2023-01-04 DIAGNOSIS — G40.909 NONINTRACTABLE EPILEPSY WITHOUT STATUS EPILEPTICUS, UNSPECIFIED EPILEPSY TYPE: Primary | ICD-10-CM

## 2023-01-04 PROBLEM — F31.9 BIPOLAR DISORDER, UNSPECIFIED: Status: ACTIVE | Noted: 2018-09-11

## 2023-01-04 PROBLEM — G62.9 POLYNEUROPATHY, UNSPECIFIED: Status: ACTIVE | Noted: 2018-09-11

## 2023-01-04 PROBLEM — E78.00 PURE HYPERCHOLESTEROLEMIA, UNSPECIFIED: Status: ACTIVE | Noted: 2018-09-11

## 2023-01-04 PROBLEM — E11.9 TYPE 2 DIABETES MELLITUS WITHOUT COMPLICATIONS: Status: ACTIVE | Noted: 2018-09-11

## 2023-01-04 PROCEDURE — 1160F RVW MEDS BY RX/DR IN RCRD: CPT | Performed by: PSYCHIATRY & NEUROLOGY

## 2023-01-04 PROCEDURE — 1159F MED LIST DOCD IN RCRD: CPT | Performed by: PSYCHIATRY & NEUROLOGY

## 2023-01-04 PROCEDURE — 99214 OFFICE O/P EST MOD 30 MIN: CPT | Performed by: PSYCHIATRY & NEUROLOGY

## 2023-01-04 RX ORDER — SEMAGLUTIDE 2.68 MG/ML
INJECTION, SOLUTION SUBCUTANEOUS
COMMUNITY
Start: 2022-10-14

## 2023-01-24 DIAGNOSIS — E11.42 DIABETIC POLYNEUROPATHY ASSOCIATED WITH TYPE 2 DIABETES MELLITUS: ICD-10-CM

## 2023-01-24 DIAGNOSIS — G40.909 SEIZURE DISORDER: ICD-10-CM

## 2023-01-25 RX ORDER — PREGABALIN 50 MG/1
CAPSULE ORAL
Qty: 60 CAPSULE | Refills: 5 | Status: SHIPPED | OUTPATIENT
Start: 2023-01-25

## 2023-02-09 RX ORDER — DAPAGLIFLOZIN 10 MG/1
TABLET, FILM COATED ORAL
COMMUNITY
Start: 2023-01-20

## 2023-02-15 ENCOUNTER — OFFICE VISIT (OUTPATIENT)
Dept: CARDIOLOGY | Facility: CLINIC | Age: 63
End: 2023-02-15
Payer: MEDICARE

## 2023-02-15 VITALS
OXYGEN SATURATION: 92 % | DIASTOLIC BLOOD PRESSURE: 78 MMHG | SYSTOLIC BLOOD PRESSURE: 116 MMHG | HEART RATE: 90 BPM | WEIGHT: 246 LBS | BODY MASS INDEX: 34.44 KG/M2 | HEIGHT: 71 IN | RESPIRATION RATE: 18 BRPM

## 2023-02-15 DIAGNOSIS — E11.9 TYPE 2 DIABETES MELLITUS WITHOUT COMPLICATION, WITHOUT LONG-TERM CURRENT USE OF INSULIN: ICD-10-CM

## 2023-02-15 DIAGNOSIS — E78.2 MIXED HYPERLIPIDEMIA: ICD-10-CM

## 2023-02-15 DIAGNOSIS — I10 ESSENTIAL HYPERTENSION: ICD-10-CM

## 2023-02-15 DIAGNOSIS — I11.9 HYPERTENSIVE HEART DISEASE WITHOUT HEART FAILURE: ICD-10-CM

## 2023-02-15 DIAGNOSIS — I25.10 ARTERIOSCLEROSIS OF CORONARY ARTERY: Primary | ICD-10-CM

## 2023-02-15 PROCEDURE — 99214 OFFICE O/P EST MOD 30 MIN: CPT | Performed by: INTERNAL MEDICINE

## 2023-02-15 PROCEDURE — 93000 ELECTROCARDIOGRAM COMPLETE: CPT | Performed by: INTERNAL MEDICINE

## 2023-02-15 NOTE — PROGRESS NOTES
"Cardiology Office Visit      Encounter Date:  02/15/2023    Patient ID:   Chirag Urbano is a 62 y.o. male.  Reason For Followup:  Coronary artery disease  Hypertension  Hyperlipidemia     Brief Clinical History:       I had the pleasure of seeing Chirag Urbano today. As you are well aware, this is a 62 y.o. male with an established history of ischemic heart disease.  He has additional history that includes hypertension, hypertensive cardiovascular disease, hyperlipidemia, diabetes mellitus, depression/anxiety, chronic pain, acid reflux, and antiplatelet therapy.  He presents today for follow-up on the above conditions.     Interval History:  He denies any chest pain pressure heaviness or tightness.  He denies any shortness of breath out of character.  He denies any PND orthopnea.  He denies any syncope or near syncope.  He reports feeling in his usual state of health.    Impressions:  Coronary artery disease     Cardiac catheterization 2015         50% proximal LAD, 30 to 40% mid RCA  Hypertension  Hypertensive cardiovascular disease  Hyperlipidemia  Diabetes mellitus  Depression/anxiety  Chronic pain  Acid reflux  Antiplatelet therapy       Recommendations:  Continuation of his current cardiovascular regimen at the present time.  Routine surveillance labs  Recent labs reviewed and discussed with patient  Obtain a copy of the labs that were done with the primary care physician  Patient denies any new cardiac symptoms  Continued aggressive discharge for acute  Follow-up in 1 year    Objective:    Vitals:  Vitals:    02/15/23 1344   BP: 116/78   BP Location: Left arm   Patient Position: Sitting   Cuff Size: Large Adult   Pulse: 90   Resp: 18   SpO2: 92%   Weight: 112 kg (246 lb)   Height: 180.3 cm (71\")       Physical Exam:    General: Alert, cooperative, no distress, appears stated age  Head:  Normocephalic, atraumatic, mucous membranes moist  Eyes:  Conjunctiva/corneas clear, EOM's intact     Neck:  Supple,  no " adenopathy;      Lungs: Clear to auscultation bilaterally, no wheezes rhonchi rales are noted  Chest wall: No tenderness  Heart::  Regular rate and rhythm, S1 and S2 normal, no murmur, rub or gallop  Abdomen: Soft, non-tender, nondistended bowel sounds active  Extremities: No cyanosis, clubbing, or edema  Pulses: 2+ and symmetric all extremities  Skin:  No rashes or lesions  Neuro/psych: A&O x3. CN II through XII are grossly intact with appropriate affect      Allergies:  No Known Allergies    Medication Review:     Current Outpatient Medications:   •  atorvastatin (LIPITOR) 80 MG tablet, Take 1 tablet by mouth Daily., Disp: 90 tablet, Rfl: 3  •  B-D UF III MINI PEN NEEDLES 31G X 5 MM misc, , Disp: , Rfl:   •  citalopram (CeleXA) 20 MG tablet, Daily., Disp: , Rfl:   •  Farxiga 10 MG tablet, , Disp: , Rfl:   •  finasteride (PROSCAR) 5 MG tablet, , Disp: , Rfl:   •  fluvoxaMINE (LUVOX) 100 MG tablet, 1.5 tablet daily, Disp: , Rfl:   •  glipizide (GLUCOTROL) 10 MG tablet, Take 10 mg by mouth Daily., Disp: , Rfl:   •  Insulin Aspart (NOVOLOG SC), Inject  under the skin into the appropriate area as directed. 50 units BID, Disp: , Rfl:   •  lamoTRIgine (LaMICtal) 150 MG tablet, Take 1 tablet by mouth 2 (Two) Times a Day., Disp: 60 tablet, Rfl: 11  •  metFORMIN (GLUCOPHAGE) 1000 MG tablet, Take 1,000 mg by mouth 2 (Two) Times a Day With Meals., Disp: , Rfl:   •  Multiple Vitamins-Minerals (MULTIVITAMIN WITH MINERALS) tablet tablet, Take 1 tablet by mouth Daily., Disp: , Rfl:   •  NovoLOG Mix 70/30 FlexPen (70-30) 100 UNIT/ML suspension pen-injector injection, , Disp: , Rfl:   •  omeprazole (priLOSEC) 20 MG capsule, OMEPRAZOLE 20 MG CPDR, Disp: , Rfl:   •  Ozempic, 2 MG/DOSE, 8 MG/3ML solution pen-injector, 4mg/3ml, Disp: , Rfl:   •  pregabalin (LYRICA) 50 MG capsule, TAKE ONE CAPSULE BY MOUTH TWO TIMES A DAY, Disp: 60 capsule, Rfl: 5  •  QUEtiapine XR (SEROquel XR) 300 MG 24 hr tablet, Take 300 mg by mouth. 2 capsules at  bedtime, Disp: , Rfl:   •  tamsulosin (FLOMAX) 0.4 MG capsule 24 hr capsule, TAMSULOSIN HCL 0.4 MG CAPS, Disp: , Rfl:   •  zolpidem (AMBIEN) 10 MG tablet, ZOLPIDEM TARTRATE 10 MG TABS, Disp: , Rfl:     Family History:  History reviewed. No pertinent family history.    Past Medical History:  Past Medical History:   Diagnosis Date   • CAD (coronary artery disease)    • Hyperlipidemia    • Hypertension    • DANICA (obstructive sleep apnea)    • Seizure (HCC)    • Tremors of nervous system        Past surgical History:  Past Surgical History:   Procedure Laterality Date   • APPENDECTOMY     • HERNIA REPAIR         Social History:  Social History     Socioeconomic History   • Marital status: Single   Tobacco Use   • Smoking status: Never   • Smokeless tobacco: Never   Vaping Use   • Vaping Use: Never used   Substance and Sexual Activity   • Alcohol use: Not Currently   • Drug use: Never   • Sexual activity: Defer       Review of Systems:  The following systems were reviewed as they relate to the cardiovascular system: Constitutional, Eyes, ENT, Cardiovascular, Respiratory, Gastrointestinal, Integumentary, Neurological, Psychiatric, Hematologic, Endocrine, Musculoskeletal, and Genitourinary. The pertinent cardiovascular findings are reported above with all other cardiovascular points within those systems being negative.    Diagnostic Study Review:     Current Electrocardiogram:    ECG 12 Lead    Date/Time: 2/15/2023 1:59 PM  Performed by: Yahir Staley MD  Authorized by: Yahir Staley MD   Comparison: compared with previous ECG   Similar to previous ECG  Rhythm: sinus rhythm  Rate: normal  BPM: 90  Conduction: conduction normal  QRS axis: normal  Other findings: non-specific ST-T wave changes    Clinical impression: abnormal EKG              NOTE: The following portions of the patient's history were reviewed and updated this visit as appropriate: allergies, current medications, past family history, past  medical history, past social history, past surgical history and problem list.

## 2023-12-28 NOTE — PROGRESS NOTES
Chief Complaint  Seizures    Subjective          Chirag Urbano presents to [facility]UR[facility] for seizures  History of Present Illness  Patient is here for follow up on seizures  . Patient states that he has still not had and Seizures. It has been years.    Medication- currently take Lamictal.           ==========================================  1-4-2023 Previous Office Visit  Seizures-patient states last seizure was 10-12 yrs ago, he currently takes lamictal 150mg 1 bid and reports no side effects.     DANICA-  pt intolerant of CPAP so not treated     Has rare headaches, take otc med for relief      Current Outpatient Medications:     atorvastatin (LIPITOR) 80 MG tablet, Take 1 tablet by mouth Daily., Disp: 90 tablet, Rfl: 3    B-D UF III MINI PEN NEEDLES 31G X 5 MM misc, , Disp: , Rfl:     citalopram (CeleXA) 20 MG tablet, Daily., Disp: , Rfl:     Farxiga 10 MG tablet, , Disp: , Rfl:     finasteride (PROSCAR) 5 MG tablet, , Disp: , Rfl:     fluvoxaMINE (LUVOX) 100 MG tablet, 1.5 tablet daily, Disp: , Rfl:     glipizide (GLUCOTROL) 10 MG tablet, Take 1 tablet by mouth Daily., Disp: , Rfl:     Invokana 100 MG tablet tablet, , Disp: , Rfl:     lamoTRIgine (LaMICtal) 150 MG tablet, Take 1 tablet by mouth 2 (Two) Times a Day., Disp: 60 tablet, Rfl: 11    metFORMIN (GLUCOPHAGE) 1000 MG tablet, Take 1 tablet by mouth 2 (Two) Times a Day With Meals., Disp: , Rfl:     Mounjaro 5 MG/0.5ML solution pen-injector pen, INJECT 5 MG UNDER SKIN WEEKLY, Disp: , Rfl:     Multiple Vitamins-Minerals (MULTIVITAMIN WITH MINERALS) tablet tablet, Take 1 tablet by mouth Daily., Disp: , Rfl:     NovoLOG Mix 70/30 FlexPen (70-30) 100 UNIT/ML suspension pen-injector injection, , Disp: , Rfl:     omeprazole (priLOSEC) 20 MG capsule, OMEPRAZOLE 20 MG CPDR, Disp: , Rfl:     Ozempic, 2 MG/DOSE, 8 MG/3ML solution pen-injector, 4mg/3ml, Disp: , Rfl:     pregabalin (LYRICA) 50 MG capsule, TAKE ONE CAPSULE BY MOUTH TWO TIMES A  "DAY, Disp: 60 capsule, Rfl: 5    QUEtiapine XR (SEROquel XR) 300 MG 24 hr tablet, Take 1 tablet by mouth. 2 capsules at bedtime, Disp: , Rfl:     zolpidem (AMBIEN) 10 MG tablet, ZOLPIDEM TARTRATE 10 MG TABS, Disp: , Rfl:     Review of Systems   Constitutional:  Negative for fever.   Neurological:  Positive for numbness. Negative for seizures.   Psychiatric/Behavioral:  Negative for sleep disturbance.    All other systems reviewed and are negative.         Objective:    Vital Signs:   /77   Pulse 99   Ht 180.3 cm (71\")   Wt 115 kg (253 lb)   BMI 35.29 kg/m²     Physical Exam  Vitals reviewed.   Pulmonary:      Effort: Pulmonary effort is normal. No respiratory distress.   Neurological:      General: No focal deficit present.      Mental Status: He is alert and oriented to person, place, and time.   Psychiatric:         Mood and Affect: Mood normal.      Result Review :                Neurologic Exam     Mental Status   Oriented to person, place, and time.       Assessment and Plan    Diagnoses and all orders for this visit:    1. Seizure disorder  Overview:  last siezure 7-8 years ago       Continue lamictal current dose     Follow Up   Return in about 1 year (around 1/4/2025).  Patient was given instructions and counseling regarding his condition or for health maintenance advice. Please see specific information pulled into the AVS if appropriate.     This document has been electronically signed by Joseph Seipel, MD on January 4, 2024 14:06 EST      "

## 2024-01-04 ENCOUNTER — OFFICE VISIT (OUTPATIENT)
Dept: NEUROLOGY | Facility: CLINIC | Age: 64
End: 2024-01-04
Payer: MEDICARE

## 2024-01-04 VITALS
DIASTOLIC BLOOD PRESSURE: 77 MMHG | WEIGHT: 253 LBS | HEIGHT: 71 IN | HEART RATE: 99 BPM | BODY MASS INDEX: 35.42 KG/M2 | SYSTOLIC BLOOD PRESSURE: 121 MMHG

## 2024-01-04 DIAGNOSIS — G40.909 SEIZURE DISORDER: ICD-10-CM

## 2024-01-04 PROBLEM — E11.9 DIABETES MELLITUS: Status: ACTIVE | Noted: 2024-01-04

## 2024-01-04 PROCEDURE — 3074F SYST BP LT 130 MM HG: CPT | Performed by: PSYCHIATRY & NEUROLOGY

## 2024-01-04 PROCEDURE — 3078F DIAST BP <80 MM HG: CPT | Performed by: PSYCHIATRY & NEUROLOGY

## 2024-01-04 PROCEDURE — 1160F RVW MEDS BY RX/DR IN RCRD: CPT | Performed by: PSYCHIATRY & NEUROLOGY

## 2024-01-04 PROCEDURE — 99213 OFFICE O/P EST LOW 20 MIN: CPT | Performed by: PSYCHIATRY & NEUROLOGY

## 2024-01-04 PROCEDURE — 1159F MED LIST DOCD IN RCRD: CPT | Performed by: PSYCHIATRY & NEUROLOGY

## 2024-01-04 RX ORDER — LAMOTRIGINE 150 MG/1
150 TABLET ORAL 2 TIMES DAILY
Qty: 180 TABLET | Refills: 3 | Status: SHIPPED | OUTPATIENT
Start: 2024-01-04

## 2024-01-04 RX ORDER — TIRZEPATIDE 5 MG/.5ML
INJECTION, SOLUTION SUBCUTANEOUS
COMMUNITY
Start: 2023-12-05

## 2024-01-04 RX ORDER — CANAGLIFLOZIN 100 MG/1
TABLET, FILM COATED ORAL
COMMUNITY
Start: 2023-12-18

## 2024-02-28 ENCOUNTER — OFFICE VISIT (OUTPATIENT)
Dept: CARDIOLOGY | Facility: CLINIC | Age: 64
End: 2024-02-28
Payer: MEDICARE

## 2024-02-28 VITALS
HEIGHT: 71 IN | HEART RATE: 84 BPM | BODY MASS INDEX: 35.42 KG/M2 | SYSTOLIC BLOOD PRESSURE: 123 MMHG | WEIGHT: 253 LBS | DIASTOLIC BLOOD PRESSURE: 76 MMHG | OXYGEN SATURATION: 93 %

## 2024-02-28 DIAGNOSIS — E11.9 TYPE 2 DIABETES MELLITUS WITHOUT COMPLICATION, WITHOUT LONG-TERM CURRENT USE OF INSULIN: ICD-10-CM

## 2024-02-28 DIAGNOSIS — I25.10 ARTERIOSCLEROSIS OF CORONARY ARTERY: ICD-10-CM

## 2024-02-28 DIAGNOSIS — E78.00 PURE HYPERCHOLESTEROLEMIA, UNSPECIFIED: Primary | ICD-10-CM

## 2024-02-28 DIAGNOSIS — I10 ESSENTIAL HYPERTENSION: ICD-10-CM

## 2024-02-28 DIAGNOSIS — E78.2 MIXED HYPERLIPIDEMIA: ICD-10-CM

## 2024-02-28 DIAGNOSIS — I11.9 HYPERTENSIVE HEART DISEASE WITHOUT HEART FAILURE: ICD-10-CM

## 2024-02-28 RX ORDER — TAMSULOSIN HYDROCHLORIDE 0.4 MG/1
1 CAPSULE ORAL DAILY
COMMUNITY

## 2024-02-28 RX ORDER — INSULIN ASPART 100 [IU]/ML
INJECTION, SOLUTION INTRAVENOUS; SUBCUTANEOUS
COMMUNITY

## 2024-02-28 NOTE — PROGRESS NOTES
"Cardiology Office Visit      Encounter Date:  02/28/2024    Patient ID:   Chirag Urbano is a 63 y.o. male.    Reason For Followup:  Coronary artery disease  Hypertension  Hyperlipidemia     Brief Clinical History:       I had the pleasure of seeing Chirag Urbano today. As you are well aware, this is a 63 y.o. male with an established history of ischemic heart disease.  He has additional history that includes hypertension, hypertensive cardiovascular disease, hyperlipidemia, diabetes mellitus, depression/anxiety, chronic pain, acid reflux, and antiplatelet therapy.  He presents today for follow-up on the above conditions.     Interval History:  He denies any chest pain pressure heaviness or tightness.  He denies any shortness of breath out of character.  He denies any PND orthopnea.  He denies any syncope or near syncope.  He reports feeling in his usual state of health.    Impressions:  Coronary artery disease     Cardiac catheterization 2015         50% proximal LAD, 30 to 40% mid RCA  Hypertension  Hypertensive cardiovascular disease  Hyperlipidemia  Diabetes mellitus  Depression/anxiety  Chronic pain  Acid reflux  Antiplatelet therapy       Recommendations:  Continuation of his current cardiovascular regimen at the present time.  Routine surveillance labs  Recent labs reviewed and discussed with patient  Patient denies any new cardiac symptoms  Continued aggressive discharge for acute  Follow-up in 1 year        Vitals:  Vitals:    02/28/24 1314   BP: 123/76   Pulse: 84   SpO2: 93%   Weight: 115 kg (253 lb)   Height: 180.3 cm (71\")       Physical Exam:    General: Alert, cooperative, no distress, appears stated age  Head:  Normocephalic, atraumatic, mucous membranes moist  Eyes:  Conjunctiva/corneas clear, EOM's intact     Neck:  Supple,  no adenopathy;      Lungs: Clear to auscultation bilaterally, no wheezes rhonchi rales are noted  Chest wall: No tenderness  Heart::  Regular rate and rhythm, S1 and S2 " "normal, no murmur, rub or gallop  Abdomen: Soft, non-tender, nondistended bowel sounds active  Extremities: No cyanosis, clubbing, or edema  Pulses: 2+ and symmetric all extremities  Skin:  No rashes or lesions  Neuro/psych: A&O x3. CN II through XII are grossly intact with appropriate affect              Lab Results   Component Value Date    GLUCOSE 139 (H) 11/23/2020    BUN 13 11/23/2020    CREATININE 0.79 11/23/2020    BCR 16.5 11/23/2020    K 4.7 11/23/2020    CO2 26.9 11/23/2020    CALCIUM 10.0 11/23/2020    ALBUMIN 3.7 03/15/2019    BILITOT 0.3 03/15/2019    AST 22 03/15/2019    ALT 34 03/15/2019        No results found for this or any previous visit.     No results found for: \"CHOL\", \"CHLPL\", \"TRIG\", \"HDL\", \"LDL\", \"LDLDIRECT\"             Objective:          Allergies:  No Known Allergies    Medication Review:     Current Outpatient Medications:     atorvastatin (LIPITOR) 80 MG tablet, Take 1 tablet by mouth Daily., Disp: 90 tablet, Rfl: 3    citalopram (CeleXA) 20 MG tablet, Daily., Disp: , Rfl:     Farxiga 10 MG tablet, , Disp: , Rfl:     finasteride (PROSCAR) 5 MG tablet, , Disp: , Rfl:     fluvoxaMINE (LUVOX) 100 MG tablet, 1.5 tablet daily, Disp: , Rfl:     glipizide (GLUCOTROL) 10 MG tablet, Take 1 tablet by mouth Daily., Disp: , Rfl:     Invokana 100 MG tablet tablet, , Disp: , Rfl:     lamoTRIgine (LaMICtal) 150 MG tablet, Take 1 tablet by mouth 2 (Two) Times a Day., Disp: 180 tablet, Rfl: 3    Magnesium Salicylate (DOANS PILLS PO), Take  by mouth., Disp: , Rfl:     metFORMIN (GLUCOPHAGE) 1000 MG tablet, Take 1 tablet by mouth 2 (Two) Times a Day With Meals., Disp: , Rfl:     Mounjaro 5 MG/0.5ML solution pen-injector pen, INJECT 5 MG UNDER SKIN WEEKLY, Disp: , Rfl:     omeprazole (priLOSEC) 20 MG capsule, OMEPRAZOLE 20 MG CPDR, Disp: , Rfl:     pregabalin (LYRICA) 50 MG capsule, TAKE ONE CAPSULE BY MOUTH TWO TIMES A DAY, Disp: 60 capsule, Rfl: 5    QUEtiapine XR (SEROquel XR) 300 MG 24 hr tablet, Take " 1 tablet by mouth. 2 capsules at bedtime, Disp: , Rfl:     tamsulosin (FLOMAX) 0.4 MG capsule 24 hr capsule, Take 1 capsule by mouth Daily., Disp: , Rfl:     zolpidem (AMBIEN) 10 MG tablet, ZOLPIDEM TARTRATE 10 MG TABS, Disp: , Rfl:     B-D UF III MINI PEN NEEDLES 31G X 5 MM misc, , Disp: , Rfl:     insulin aspart (NovoLOG FlexPen) 100 UNIT/ML solution pen-injector sc pen, , Disp: , Rfl:     Multiple Vitamins-Minerals (MULTIVITAMIN WITH MINERALS) tablet tablet, Take 1 tablet by mouth Daily. (Patient not taking: Reported on 2/28/2024), Disp: , Rfl:     NovoLOG Mix 70/30 FlexPen (70-30) 100 UNIT/ML suspension pen-injector injection, , Disp: , Rfl:     Family History:  Family History   Problem Relation Age of Onset    No Known Problems Mother     No Known Problems Father        Past Medical History:  Past Medical History:   Diagnosis Date    CAD (coronary artery disease)     Hyperlipidemia     Hypertension     DANICA (obstructive sleep apnea)     Seizure     Tremors of nervous system        Past surgical History:  Past Surgical History:   Procedure Laterality Date    APPENDECTOMY      HERNIA REPAIR         Social History:  Social History     Socioeconomic History    Marital status: Single   Tobacco Use    Smoking status: Never     Passive exposure: Never    Smokeless tobacco: Never   Vaping Use    Vaping Use: Never used   Substance and Sexual Activity    Alcohol use: Not Currently    Drug use: Never    Sexual activity: Defer       Review of Systems:  The following systems were reviewed as they relate to the cardiovascular system: Constitutional, Eyes, ENT, Cardiovascular, Respiratory, Gastrointestinal, Integumentary, Neurological, Psychiatric, Hematologic, Endocrine, Musculoskeletal, and Genitourinary. The pertinent cardiovascular findings are reported above with all other cardiovascular points within those systems being negative.    Diagnostic Study Review:     Current Electrocardiogram:    ECG 12 Lead    Date/Time:  2/28/2024 1:31 PM  Performed by: Yahir Staley MD    Authorized by: Yahir Staley MD  Comparison: compared with previous ECG   Similar to previous ECG  Rhythm: sinus rhythm  Rate: normal  BPM: 84  Conduction: left anterior fascicular block  QRS axis: left  Other findings: non-specific ST-T wave changes    Clinical impression: abnormal EKG                NOTE: The following portions of the patient's history were reviewed and updated this visit as appropriate: allergies, current medications, past family history, past medical history, past social history, past surgical history and problem list.

## 2025-01-28 NOTE — PROGRESS NOTES
Chief Complaint  Seizures    Subjective          Chirag Urbano presents to [facility] for SEIZURES  History of Present Illness  Patient is here for follow up on seizures, he currently takes lamical 150 mg 1 bid and lyrica 50mg bid  No seizures since last visit    Diabetic neuropathy , not a problem , no pain, was given lyrica for pain but not renewed since 2023 jan,  so will take off list and pt can check and see if it is in his pill pack,    Jonathan, pt not using cpap, as was not able to sleep with it .     ====PREV. OV 1/4/24======  Patient is here for follow up on seizures  . Patient states that he has still not had and Seizures. It has been years.     Medication- currently take Lamictal.     Current Outpatient Medications:     atorvastatin (LIPITOR) 80 MG tablet, Take 1 tablet by mouth Daily., Disp: 90 tablet, Rfl: 3    B-D UF III MINI PEN NEEDLES 31G X 5 MM misc, , Disp: , Rfl:     citalopram (CeleXA) 20 MG tablet, Daily., Disp: , Rfl:     DayVigo 5 MG tablet, , Disp: , Rfl:     Farxiga 10 MG tablet, , Disp: , Rfl:     finasteride (PROSCAR) 5 MG tablet, , Disp: , Rfl:     fluvoxaMINE (LUVOX) 100 MG tablet, 1.5 tablet daily, Disp: , Rfl:     glipizide (GLUCOTROL) 10 MG tablet, Take 1 tablet by mouth Daily., Disp: , Rfl:     insulin aspart (NovoLOG FlexPen) 100 UNIT/ML solution pen-injector sc pen, , Disp: , Rfl:     Invokana 100 MG tablet tablet, , Disp: , Rfl:     lamoTRIgine (LaMICtal) 150 MG tablet, Take 1 tablet by mouth 2 (Two) Times a Day., Disp: 180 tablet, Rfl: 3    Magnesium Salicylate (DOANS PILLS PO), Take  by mouth., Disp: , Rfl:     metFORMIN (GLUCOPHAGE) 1000 MG tablet, Take 1 tablet by mouth 2 (Two) Times a Day With Meals., Disp: , Rfl:     Multiple Vitamins-Minerals (MULTIVITAMIN WITH MINERALS) tablet tablet, Take 1 tablet by mouth Daily., Disp: , Rfl:     NovoLOG Mix 70/30 FlexPen (70-30) 100 UNIT/ML suspension pen-injector injection, , Disp: , Rfl:     omeprazole  "(priLOSEC) 20 MG capsule, OMEPRAZOLE 20 MG CPDR, Disp: , Rfl:     Ozempic, 0.25 or 0.5 MG/DOSE, 2 MG/3ML solution pen-injector, INJECT 0.5 MG INTO THE SKIN EVERY 7 DAYS FOR 7 DAYS., Disp: , Rfl:     QUEtiapine XR (SEROquel XR) 300 MG 24 hr tablet, Take 1 tablet by mouth. 2 capsules at bedtime, Disp: , Rfl:     tamsulosin (FLOMAX) 0.4 MG capsule 24 hr capsule, Take 1 capsule by mouth Daily., Disp: , Rfl:     zolpidem (AMBIEN) 10 MG tablet, ZOLPIDEM TARTRATE 10 MG TABS, Disp: , Rfl:     Mounjaro 5 MG/0.5ML solution pen-injector pen, INJECT 5 MG UNDER SKIN WEEKLY (Patient not taking: Reported on 1/31/2025), Disp: , Rfl:     Review of Systems   Constitutional:  Negative for fatigue.   Neurological:  Negative for seizures and numbness.   Psychiatric/Behavioral:  Negative for sleep disturbance.    All other systems reviewed and are negative.         Objective:    Vital Signs:   /77   Pulse 83   Ht 180.3 cm (71\")   Wt 114 kg (252 lb)   BMI 35.15 kg/m²     Physical Exam  Vitals reviewed.   Pulmonary:      Effort: Pulmonary effort is normal. No respiratory distress.   Neurological:      Mental Status: He is alert and oriented to person, place, and time.   Psychiatric:         Mood and Affect: Mood normal.        Result Review :                Neurological Exam  Mental Status  Alert. Oriented to person, place, and time.        Assessment and Plan    Diagnoses and all orders for this visit:    1. Seizure disorder (Primary)  Overview:  last siezure about 2012  Onset of seizures with stress, stopped having seizures when moved here away from the stress the spells gradually stopped.       2. DANICA (obstructive sleep apnea)    3. Polyneuropathy, unspecified     Pt may have had stress related seizures, he is on Lamictal also for mood, if the psychiatrist wanted to stop the Lamictal suggest a repeat eeg prior to weaning  Unable to use cpap  No neuropathy pain, he can try to stop the lyrica if he is still taking this "     Follow Up   Return in about 1 year (around 1/31/2026).  Patient was given instructions and counseling regarding his condition or for health maintenance advice. Please see specific information pulled into the AVS if appropriate.     This document has been electronically signed by Joseph Seipel, MD on January 31, 2025 13:13 EST

## 2025-01-31 ENCOUNTER — OFFICE VISIT (OUTPATIENT)
Dept: NEUROLOGY | Facility: CLINIC | Age: 65
End: 2025-01-31
Payer: MEDICARE

## 2025-01-31 VITALS
BODY MASS INDEX: 35.28 KG/M2 | HEIGHT: 71 IN | SYSTOLIC BLOOD PRESSURE: 117 MMHG | DIASTOLIC BLOOD PRESSURE: 77 MMHG | WEIGHT: 252 LBS | HEART RATE: 83 BPM

## 2025-01-31 DIAGNOSIS — G47.33 OSA (OBSTRUCTIVE SLEEP APNEA): ICD-10-CM

## 2025-01-31 DIAGNOSIS — G40.909 SEIZURE DISORDER: Primary | ICD-10-CM

## 2025-01-31 DIAGNOSIS — G62.9 POLYNEUROPATHY, UNSPECIFIED: ICD-10-CM

## 2025-01-31 PROCEDURE — 99214 OFFICE O/P EST MOD 30 MIN: CPT | Performed by: PSYCHIATRY & NEUROLOGY

## 2025-01-31 PROCEDURE — 1159F MED LIST DOCD IN RCRD: CPT | Performed by: PSYCHIATRY & NEUROLOGY

## 2025-01-31 PROCEDURE — 1160F RVW MEDS BY RX/DR IN RCRD: CPT | Performed by: PSYCHIATRY & NEUROLOGY

## 2025-01-31 RX ORDER — SEMAGLUTIDE 0.68 MG/ML
INJECTION, SOLUTION SUBCUTANEOUS
COMMUNITY
Start: 2025-01-17

## 2025-01-31 RX ORDER — LEMBOREXANT 5 MG/1
TABLET, FILM COATED ORAL
COMMUNITY
Start: 2024-09-05

## 2025-03-12 ENCOUNTER — OFFICE VISIT (OUTPATIENT)
Dept: CARDIOLOGY | Facility: CLINIC | Age: 65
End: 2025-03-12
Payer: MEDICARE

## 2025-03-12 VITALS
DIASTOLIC BLOOD PRESSURE: 77 MMHG | HEART RATE: 82 BPM | WEIGHT: 243.6 LBS | HEIGHT: 71 IN | OXYGEN SATURATION: 95 % | BODY MASS INDEX: 34.1 KG/M2 | SYSTOLIC BLOOD PRESSURE: 110 MMHG

## 2025-03-12 DIAGNOSIS — Z79.4 TYPE 2 DIABETES MELLITUS WITHOUT COMPLICATION, WITH LONG-TERM CURRENT USE OF INSULIN: ICD-10-CM

## 2025-03-12 DIAGNOSIS — E78.2 MIXED HYPERLIPIDEMIA: ICD-10-CM

## 2025-03-12 DIAGNOSIS — I25.10 ARTERIOSCLEROSIS OF CORONARY ARTERY: Primary | ICD-10-CM

## 2025-03-12 DIAGNOSIS — E11.9 TYPE 2 DIABETES MELLITUS WITHOUT COMPLICATION, WITH LONG-TERM CURRENT USE OF INSULIN: ICD-10-CM

## 2025-03-12 DIAGNOSIS — E78.00 PURE HYPERCHOLESTEROLEMIA, UNSPECIFIED: ICD-10-CM

## 2025-03-12 DIAGNOSIS — I11.9 HYPERTENSIVE HEART DISEASE WITHOUT HEART FAILURE: ICD-10-CM

## 2025-03-12 RX ORDER — PREGABALIN 50 MG/1
50 CAPSULE ORAL 2 TIMES DAILY
COMMUNITY
Start: 2025-03-05

## 2025-03-12 NOTE — PROGRESS NOTES
Cardiology Office Visit      Encounter Date:  03/12/2025    Patient ID:   Chirag Urbano is a 65 y.o. male.    Reason For Followup:  Coronary artery disease  Hypertension  Hyperlipidemia     Brief Clinical History:       I had the pleasure of seeing Chirag Urbano today. As you are well aware, this is a 65 y.o. male with an established history of ischemic heart disease.  He has additional history that includes hypertension, hypertensive cardiovascular disease, hyperlipidemia, diabetes mellitus, depression/anxiety, chronic pain, acid reflux, and antiplatelet therapy.  He presents today for follow-up on the above conditions.      Interval History:  He denies any chest pain pressure heaviness or tightness.  He denies any shortness of breath out of character.  He denies any PND orthopnea.  He denies any syncope or near syncope.  He reports feeling in his usual state of health.  Denies any new cardiac symptoms  Good functional status  Denies any exertional chest pain    Impressions:  Coronary artery disease     Cardiac catheterization 2015         50% proximal LAD, 30 to 40% mid RCA  Hypertension  Hypertensive cardiovascular disease  Hyperlipidemia  Diabetes mellitus  Depression/anxiety  Chronic pain  Acid reflux  Antiplatelet therapy       Recommendations:  Continuation of his current cardiovascular regimen at the present time.  Routine surveillance labs  Recent labs reviewed and discussed with patient  Patient denies any new cardiac symptoms  Continued aggressive discharge for acute  Recent labs and workup reviewed discussed with patient  Need for compliance with medical therapy reviewed and discussed with patient  Continue aggressive risk factor modification  Current medications include Lipitor 80 mg p.o. once a day  Need for aggressive risk factor modification are reviewed and discussed  Prior medical records reviewed and discussed patient  Commend vascular screening and calcium score  Follow-up in 1  "year        Vitals:  Vitals:    03/12/25 1242   BP: 110/77   Pulse: 82   SpO2: 95%   Weight: 110 kg (243 lb 9.6 oz)   Height: 180.3 cm (71\")       Physical Exam:    General: Alert, cooperative, no distress, appears stated age  Head:  Normocephalic, atraumatic, mucous membranes moist  Eyes:  Conjunctiva/corneas clear, EOM's intact     Neck:  Supple,  no adenopathy;      Lungs: Clear to auscultation bilaterally, no wheezes rhonchi rales are noted  Chest wall: No tenderness  Heart::  Regular rate and rhythm, S1 and S2 normal, no murmur, rub or gallop  Abdomen: Soft, non-tender, nondistended bowel sounds active  Extremities: No cyanosis, clubbing, or edema  Pulses: 2+ and symmetric all extremities  Skin:  No rashes or lesions  Neuro/psych: A&O x3. CN II through XII are grossly intact with appropriate affect              Lab Results   Component Value Date    GLUCOSE 139 (H) 11/23/2020    BUN 13 11/23/2020    CREATININE 0.79 11/23/2020    BCR 16.5 11/23/2020    K 4.7 11/23/2020    CO2 26.9 11/23/2020    CALCIUM 10.0 11/23/2020    ALBUMIN 3.7 03/15/2019    BILITOT 0.3 03/15/2019    AST 22 03/15/2019    ALT 34 03/15/2019        No results found for this or any previous visit.     No results found for: \"CHOL\", \"CHLPL\", \"TRIG\", \"HDL\", \"LDL\", \"LDLDIRECT\"             Objective:          Allergies:  No Known Allergies    Medication Review:     Current Outpatient Medications:     atorvastatin (LIPITOR) 80 MG tablet, Take 1 tablet by mouth Daily., Disp: 90 tablet, Rfl: 3    B-D UF III MINI PEN NEEDLES 31G X 5 MM misc, , Disp: , Rfl:     cephalexin (KEFLEX) 250 MG capsule, Take 1 capsule by mouth Daily., Disp: , Rfl:     citalopram (CeleXA) 20 MG tablet, Daily., Disp: , Rfl:     DayVigo 5 MG tablet, , Disp: , Rfl:     Farxiga 10 MG tablet, , Disp: , Rfl:     finasteride (PROSCAR) 5 MG tablet, , Disp: , Rfl:     fluvoxaMINE (LUVOX) 100 MG tablet, 1.5 tablet daily, Disp: , Rfl:     glipizide (GLUCOTROL) 10 MG tablet, Take 1 tablet by " mouth Daily., Disp: , Rfl:     insulin aspart (NovoLOG FlexPen) 100 UNIT/ML solution pen-injector sc pen, , Disp: , Rfl:     Invokana 100 MG tablet tablet, , Disp: , Rfl:     lamoTRIgine (LaMICtal) 150 MG tablet, Take 1 tablet by mouth 2 (Two) Times a Day., Disp: 180 tablet, Rfl: 3    metFORMIN (GLUCOPHAGE) 1000 MG tablet, Take 1 tablet by mouth 2 (Two) Times a Day With Meals., Disp: , Rfl:     NovoLOG Mix 70/30 FlexPen (70-30) 100 UNIT/ML suspension pen-injector injection, , Disp: , Rfl:     omeprazole (priLOSEC) 20 MG capsule, OMEPRAZOLE 20 MG CPDR, Disp: , Rfl:     Ozempic, 0.25 or 0.5 MG/DOSE, 2 MG/3ML solution pen-injector, INJECT 0.5 MG INTO THE SKIN EVERY 7 DAYS FOR 7 DAYS., Disp: , Rfl:     pregabalin (LYRICA) 50 MG capsule, Take 1 capsule by mouth 2 (Two) Times a Day., Disp: , Rfl:     QUEtiapine XR (SEROquel XR) 300 MG 24 hr tablet, Take 1 tablet by mouth. 2 capsules at bedtime, Disp: , Rfl:     tamsulosin (FLOMAX) 0.4 MG capsule 24 hr capsule, Take 1 capsule by mouth Daily., Disp: , Rfl:     Mounjaro 5 MG/0.5ML solution pen-injector pen, INJECT 5 MG UNDER SKIN WEEKLY (Patient not taking: Reported on 1/31/2025), Disp: , Rfl:     Family History:  Family History   Problem Relation Age of Onset    No Known Problems Mother     No Known Problems Father        Past Medical History:  Past Medical History:   Diagnosis Date    CAD (coronary artery disease)     Essential hypertension 10/16/2014    Hyperlipidemia     Hypertension     DANICA (obstructive sleep apnea)     Seizure     Tremors of nervous system        Past surgical History:  Past Surgical History:   Procedure Laterality Date    APPENDECTOMY      HERNIA REPAIR         Social History:  Social History     Socioeconomic History    Marital status: Single   Tobacco Use    Smoking status: Never     Passive exposure: Never    Smokeless tobacco: Never   Vaping Use    Vaping status: Never Used   Substance and Sexual Activity    Alcohol use: Not Currently    Drug use:  Never    Sexual activity: Defer       Review of Systems:  The following systems were reviewed as they relate to the cardiovascular system: Constitutional, Eyes, ENT, Cardiovascular, Respiratory, Gastrointestinal, Integumentary, Neurological, Psychiatric, Hematologic, Endocrine, Musculoskeletal, and Genitourinary. The pertinent cardiovascular findings are reported above with all other cardiovascular points within those systems being negative.    Diagnostic Study Review:     Current Electrocardiogram:    ECG 12 Lead    Date/Time: 3/12/2025 1:04 PM  Performed by: Yahir Staley MD    Authorized by: Yahir Staley MD  Comparison: compared with previous ECG   Similar to previous ECG  Rhythm: sinus rhythm  Rate: normal  BPM: 82  Conduction: conduction normal  ST Segments: ST segments normal  T Waves: T waves normal  QRS axis: normal    Clinical impression: normal ECG                NOTE: The following portions of the patient's history were reviewed and updated this visit as appropriate: allergies, current medications, past family history, past medical history, past social history, past surgical history and problem list.